# Patient Record
Sex: MALE | Race: WHITE | NOT HISPANIC OR LATINO | URBAN - METROPOLITAN AREA
[De-identification: names, ages, dates, MRNs, and addresses within clinical notes are randomized per-mention and may not be internally consistent; named-entity substitution may affect disease eponyms.]

---

## 2020-11-30 ENCOUNTER — EMERGENCY (EMERGENCY)
Facility: HOSPITAL | Age: 35
LOS: 1 days | Discharge: HOME | End: 2020-11-30
Attending: EMERGENCY MEDICINE | Admitting: EMERGENCY MEDICINE
Payer: COMMERCIAL

## 2020-11-30 VITALS
HEART RATE: 86 BPM | HEIGHT: 64 IN | DIASTOLIC BLOOD PRESSURE: 87 MMHG | RESPIRATION RATE: 15 BRPM | WEIGHT: 175.05 LBS | OXYGEN SATURATION: 97 % | SYSTOLIC BLOOD PRESSURE: 135 MMHG | TEMPERATURE: 98 F

## 2020-11-30 VITALS
SYSTOLIC BLOOD PRESSURE: 119 MMHG | OXYGEN SATURATION: 98 % | RESPIRATION RATE: 16 BRPM | DIASTOLIC BLOOD PRESSURE: 79 MMHG | HEART RATE: 80 BPM | TEMPERATURE: 98 F

## 2020-11-30 DIAGNOSIS — Z98.890 OTHER SPECIFIED POSTPROCEDURAL STATES: Chronic | ICD-10-CM

## 2020-11-30 PROCEDURE — 99283 EMERGENCY DEPT VISIT LOW MDM: CPT

## 2020-11-30 RX ORDER — OXYCODONE AND ACETAMINOPHEN 5; 325 MG/1; MG/1
2 TABLET ORAL
Qty: 24 | Refills: 0
Start: 2020-11-30 | End: 2020-12-02

## 2020-11-30 RX ORDER — OXYCODONE AND ACETAMINOPHEN 5; 325 MG/1; MG/1
2 TABLET ORAL ONCE
Refills: 0 | Status: DISCONTINUED | OUTPATIENT
Start: 2020-11-30 | End: 2020-11-30

## 2020-11-30 RX ORDER — DIAZEPAM 5 MG
1 TABLET ORAL
Qty: 15 | Refills: 0
Start: 2020-11-30 | End: 2020-12-04

## 2020-11-30 RX ORDER — DIAZEPAM 5 MG
5 TABLET ORAL ONCE
Refills: 0 | Status: DISCONTINUED | OUTPATIENT
Start: 2020-11-30 | End: 2020-11-30

## 2020-11-30 RX ADMIN — OXYCODONE AND ACETAMINOPHEN 2 TABLET(S): 5; 325 TABLET ORAL at 23:16

## 2020-11-30 RX ADMIN — OXYCODONE AND ACETAMINOPHEN 2 TABLET(S): 5; 325 TABLET ORAL at 22:35

## 2020-11-30 RX ADMIN — Medication 5 MILLIGRAM(S): at 22:36

## 2020-11-30 NOTE — ED PROVIDER NOTE - PROGRESS NOTE DETAILS
pt reevaluted, feeling better, pain has improved, able to get up and ambulate with improvement, pt to be d/c home follow up with ortho, d/c with percocet and valium, return if any symtpoms worsen

## 2020-11-30 NOTE — ED ADULT NURSE NOTE - OBJECTIVE STATEMENT
pt reports he has back pain; was unable to get off his couch. took NSAID at home. GOINS. respirations even and unlabored

## 2020-11-30 NOTE — ED PROVIDER NOTE - OBJECTIVE STATEMENT
34yo male bib ems with back pain anjum. pt states he has been having some back pain after buying a new chair and it has been manageable but anjum got up from the dinner table and felt a sharp pain in his low back, worse with moving, no tingling or weakness, pt took motrin at 7pm with no relief.

## 2020-11-30 NOTE — ED PROVIDER NOTE - CARE PROVIDER_API CALL
Eusebio Pratt  ORTHOPAEDIC SURGERY  [address] [facility] [address], [place] [address]  Phone: [phone]  Fax: [phone]  Follow Up Time:

## 2020-11-30 NOTE — ED PROVIDER NOTE - PATIENT PORTAL LINK FT
You can access the FollowMyHealth Patient Portal offered by [facility] by registering at the following website: [email or web address]. By joining [facility]’s FollowMyHealth portal, you will also be able to view your health information using other applications (apps) compatible with our system.

## 2020-11-30 NOTE — ED ADULT NURSE REASSESSMENT NOTE - NS ED NURSE REASSESS COMMENT FT1
pt was assisted; up and about; able to ambulate; reporting no change in pain. pt d/c to self. verbalized understanding of d/c instructions. left unit via w/c in NAD

## 2024-04-05 NOTE — ED ADULT NURSE NOTE - NSFALLRSKINDICATORS_ED_ALL_ED
Link to setting up online account with mail order pharmacy for Contrave:  [email or web address]      Naltrexone/Bupropion (ContraveÂ®, MysimbaÂ®)    How this medication works   This medication decreases food intake and increases energy expenditure by acting on receptors in the brain. Reasons to take this medication  This medication is approved for the treatment of obesity in individuals who have a Body Mass Index (BMI) of over 30 or who have a BMI over 27 with other health issues related to their weight. This medication is meant to be taken in addition to exercise and a reduced calorie diet . Weight loss shown by studies of this medication  Studies include diet and exercise changes in addition to testing the medication. Studies on this medication have shown 3.2% - 5.2% weight loss after one year. Studies show that 23% - 32% of people lost over 5% of their weight after one year. Contraindications (Situations when this drug should not be used because it will likely cause harm.)  You should not take this medication if you have difficulty controlling your blood pressure, have seizure disorder, anorexia or bulimia nervosa, are pregnant, or take MAO inhibitors. Do not take this medication if you have had recent abrupt discontinuation of alcohol, benzodiazepine, barbiturate or seizure medication. Do not take this medication if you are already on bupropion. Do not take this medication if you are on opioids. Side effects (Undesirable symptoms that may occur in some individuals while taking this medication. You may need to stop taking the medication if these are unable to be tolerated.) and Risks (Adverse effects of the medication that may cause injury.   You may need treatment for the adverse effects if they occur.)  Nausea, headache, constipation, dizziness, vomiting, dry mouth, depression, insomnia, diarrhea, anxiety    Reasons to stop this medication  This medication should be stopped if you experience side effects that are not tolerable or are unsafe. This medication should be stopped if at least 5% weight loss is not achieved after 12 weeks on your goal dose of the medication.      Call with questions or concerns  [facility]  [address] [place], [address] [facility],[address]  [phone] yes